# Patient Record
Sex: FEMALE | Race: WHITE | NOT HISPANIC OR LATINO | Employment: OTHER | URBAN - METROPOLITAN AREA
[De-identification: names, ages, dates, MRNs, and addresses within clinical notes are randomized per-mention and may not be internally consistent; named-entity substitution may affect disease eponyms.]

---

## 2023-03-22 LAB
AMMONIA (UMOL/L) IN PLASMA: 41 UMOL/L
ANION GAP IN SER/PLAS: 12 MMOL/L (ref 10–20)
BASOPHILS (10*3/UL) IN BLOOD BY AUTOMATED COUNT: 0.01 X10E9/L (ref 0–0.1)
BASOPHILS/100 LEUKOCYTES IN BLOOD BY AUTOMATED COUNT: 0.1 % (ref 0–2)
CALCIUM (MG/DL) IN SER/PLAS: 8.8 MG/DL (ref 8.6–10.3)
CARBON DIOXIDE, TOTAL (MMOL/L) IN SER/PLAS: 30 MMOL/L (ref 21–32)
CHLORIDE (MMOL/L) IN SER/PLAS: 101 MMOL/L (ref 98–107)
CREATININE (MG/DL) IN SER/PLAS: 0.98 MG/DL (ref 0.5–1.05)
EOSINOPHILS (10*3/UL) IN BLOOD BY AUTOMATED COUNT: 0.16 X10E9/L (ref 0–0.4)
EOSINOPHILS/100 LEUKOCYTES IN BLOOD BY AUTOMATED COUNT: 2 % (ref 0–6)
ERYTHROCYTE DISTRIBUTION WIDTH (RATIO) BY AUTOMATED COUNT: 15.4 % (ref 11.5–14.5)
ERYTHROCYTE MEAN CORPUSCULAR HEMOGLOBIN CONCENTRATION (G/DL) BY AUTOMATED: 29.9 G/DL (ref 32–36)
ERYTHROCYTE MEAN CORPUSCULAR VOLUME (FL) BY AUTOMATED COUNT: 89 FL (ref 80–100)
ERYTHROCYTES (10*6/UL) IN BLOOD BY AUTOMATED COUNT: 4.33 X10E12/L (ref 4–5.2)
GFR FEMALE: 57 ML/MIN/1.73M2
GLUCOSE (MG/DL) IN SER/PLAS: 110 MG/DL (ref 74–99)
HEMATOCRIT (%) IN BLOOD BY AUTOMATED COUNT: 38.4 % (ref 36–46)
HEMOGLOBIN (G/DL) IN BLOOD: 11.5 G/DL (ref 12–16)
IMMATURE GRANULOCYTES/100 LEUKOCYTES IN BLOOD BY AUTOMATED COUNT: 0.4 % (ref 0–0.9)
LEUKOCYTES (10*3/UL) IN BLOOD BY AUTOMATED COUNT: 8.1 X10E9/L (ref 4.4–11.3)
LYMPHOCYTES (10*3/UL) IN BLOOD BY AUTOMATED COUNT: 1.58 X10E9/L (ref 0.8–3)
LYMPHOCYTES/100 LEUKOCYTES IN BLOOD BY AUTOMATED COUNT: 19.6 % (ref 13–44)
MONOCYTES (10*3/UL) IN BLOOD BY AUTOMATED COUNT: 0.64 X10E9/L (ref 0.05–0.8)
MONOCYTES/100 LEUKOCYTES IN BLOOD BY AUTOMATED COUNT: 7.9 % (ref 2–10)
NEUTROPHILS (10*3/UL) IN BLOOD BY AUTOMATED COUNT: 5.65 X10E9/L (ref 1.6–5.5)
NEUTROPHILS/100 LEUKOCYTES IN BLOOD BY AUTOMATED COUNT: 70 % (ref 40–80)
NRBC (PER 100 WBCS) BY AUTOMATED COUNT: 0 /100 WBC (ref 0–0)
PLATELETS (10*3/UL) IN BLOOD AUTOMATED COUNT: 163 X10E9/L (ref 150–450)
POTASSIUM (MMOL/L) IN SER/PLAS: 4.4 MMOL/L (ref 3.5–5.3)
SODIUM (MMOL/L) IN SER/PLAS: 139 MMOL/L (ref 136–145)
UREA NITROGEN (MG/DL) IN SER/PLAS: 19 MG/DL (ref 6–23)

## 2025-05-12 ENCOUNTER — APPOINTMENT (OUTPATIENT)
Dept: RADIOLOGY | Facility: HOSPITAL | Age: 85
End: 2025-05-12
Payer: COMMERCIAL

## 2025-05-12 ENCOUNTER — HOSPITAL ENCOUNTER (EMERGENCY)
Facility: HOSPITAL | Age: 85
Discharge: HOME | End: 2025-05-12
Attending: EMERGENCY MEDICINE
Payer: COMMERCIAL

## 2025-05-12 VITALS
SYSTOLIC BLOOD PRESSURE: 139 MMHG | TEMPERATURE: 97 F | HEIGHT: 60 IN | HEART RATE: 73 BPM | BODY MASS INDEX: 42.85 KG/M2 | WEIGHT: 218.26 LBS | DIASTOLIC BLOOD PRESSURE: 69 MMHG | OXYGEN SATURATION: 98 % | RESPIRATION RATE: 14 BRPM

## 2025-05-12 DIAGNOSIS — S01.01XA LACERATION OF SCALP, INITIAL ENCOUNTER: ICD-10-CM

## 2025-05-12 DIAGNOSIS — M25.571 ACUTE RIGHT ANKLE PAIN: ICD-10-CM

## 2025-05-12 DIAGNOSIS — S09.90XA HEAD INJURY, INITIAL ENCOUNTER: Primary | ICD-10-CM

## 2025-05-12 PROCEDURE — 99285 EMERGENCY DEPT VISIT HI MDM: CPT | Performed by: EMERGENCY MEDICINE

## 2025-05-12 PROCEDURE — 12004 RPR S/N/AX/GEN/TRK7.6-12.5CM: CPT

## 2025-05-12 PROCEDURE — 2500000004 HC RX 250 GENERAL PHARMACY W/ HCPCS (ALT 636 FOR OP/ED): Mod: JZ | Performed by: EMERGENCY MEDICINE

## 2025-05-12 PROCEDURE — 96372 THER/PROPH/DIAG INJ SC/IM: CPT | Performed by: EMERGENCY MEDICINE

## 2025-05-12 PROCEDURE — 72125 CT NECK SPINE W/O DYE: CPT | Performed by: STUDENT IN AN ORGANIZED HEALTH CARE EDUCATION/TRAINING PROGRAM

## 2025-05-12 PROCEDURE — 2500000005 HC RX 250 GENERAL PHARMACY W/O HCPCS

## 2025-05-12 PROCEDURE — 73620 X-RAY EXAM OF FOOT: CPT | Mod: RIGHT SIDE | Performed by: RADIOLOGY

## 2025-05-12 PROCEDURE — 70450 CT HEAD/BRAIN W/O DYE: CPT | Performed by: STUDENT IN AN ORGANIZED HEALTH CARE EDUCATION/TRAINING PROGRAM

## 2025-05-12 PROCEDURE — 73610 X-RAY EXAM OF ANKLE: CPT | Mod: RIGHT SIDE | Performed by: RADIOLOGY

## 2025-05-12 PROCEDURE — 70450 CT HEAD/BRAIN W/O DYE: CPT

## 2025-05-12 PROCEDURE — 73620 X-RAY EXAM OF FOOT: CPT | Mod: RT

## 2025-05-12 PROCEDURE — 72125 CT NECK SPINE W/O DYE: CPT

## 2025-05-12 PROCEDURE — 2500000001 HC RX 250 WO HCPCS SELF ADMINISTERED DRUGS (ALT 637 FOR MEDICARE OP): Performed by: EMERGENCY MEDICINE

## 2025-05-12 PROCEDURE — 99284 EMERGENCY DEPT VISIT MOD MDM: CPT | Mod: 25 | Performed by: EMERGENCY MEDICINE

## 2025-05-12 PROCEDURE — 73610 X-RAY EXAM OF ANKLE: CPT | Mod: RT

## 2025-05-12 PROCEDURE — 2500000005 HC RX 250 GENERAL PHARMACY W/O HCPCS: Performed by: EMERGENCY MEDICINE

## 2025-05-12 PROCEDURE — 12004 RPR S/N/AX/GEN/TRK7.6-12.5CM: CPT | Performed by: EMERGENCY MEDICINE

## 2025-05-12 RX ORDER — MORPHINE SULFATE 2 MG/ML
2 INJECTION, SOLUTION INTRAMUSCULAR; INTRAVENOUS ONCE
Status: COMPLETED | OUTPATIENT
Start: 2025-05-12 | End: 2025-05-12

## 2025-05-12 RX ORDER — BACITRACIN ZINC 500 UNIT/G
OINTMENT IN PACKET (EA) TOPICAL ONCE
Status: COMPLETED | OUTPATIENT
Start: 2025-05-12 | End: 2025-05-12

## 2025-05-12 RX ORDER — ACETAMINOPHEN 325 MG/1
650 TABLET ORAL ONCE
Status: COMPLETED | OUTPATIENT
Start: 2025-05-12 | End: 2025-05-12

## 2025-05-12 RX ORDER — MORPHINE SULFATE 2 MG/ML
2 INJECTION, SOLUTION INTRAMUSCULAR; INTRAVENOUS ONCE
Status: DISCONTINUED | OUTPATIENT
Start: 2025-05-12 | End: 2025-05-12

## 2025-05-12 RX ADMIN — Medication 1 APPLICATION: at 01:21

## 2025-05-12 RX ADMIN — MORPHINE SULFATE 2 MG: 2 INJECTION, SOLUTION INTRAMUSCULAR; INTRAVENOUS at 04:15

## 2025-05-12 RX ADMIN — BACITRACIN 1 APPLICATION: 500 OINTMENT TOPICAL at 03:01

## 2025-05-12 RX ADMIN — ACETAMINOPHEN 650 MG: 325 TABLET ORAL at 01:21

## 2025-05-12 ASSESSMENT — LIFESTYLE VARIABLES
TOTAL SCORE: 0
HAVE YOU EVER FELT YOU SHOULD CUT DOWN ON YOUR DRINKING: NO
EVER FELT BAD OR GUILTY ABOUT YOUR DRINKING: NO
HAVE PEOPLE ANNOYED YOU BY CRITICIZING YOUR DRINKING: NO
EVER HAD A DRINK FIRST THING IN THE MORNING TO STEADY YOUR NERVES TO GET RID OF A HANGOVER: NO

## 2025-05-12 ASSESSMENT — PAIN - FUNCTIONAL ASSESSMENT
PAIN_FUNCTIONAL_ASSESSMENT: 0-10

## 2025-05-12 ASSESSMENT — COLUMBIA-SUICIDE SEVERITY RATING SCALE - C-SSRS
6. HAVE YOU EVER DONE ANYTHING, STARTED TO DO ANYTHING, OR PREPARED TO DO ANYTHING TO END YOUR LIFE?: NO
1. IN THE PAST MONTH, HAVE YOU WISHED YOU WERE DEAD OR WISHED YOU COULD GO TO SLEEP AND NOT WAKE UP?: NO
2. HAVE YOU ACTUALLY HAD ANY THOUGHTS OF KILLING YOURSELF?: NO

## 2025-05-12 ASSESSMENT — PAIN SCALES - GENERAL
PAINLEVEL_OUTOF10: 8
PAINLEVEL_OUTOF10: 0 - NO PAIN
PAINLEVEL_OUTOF10: 5 - MODERATE PAIN

## 2025-05-12 NOTE — ED PROVIDER NOTES
Emergency Department Provider Note       EMERGENCY DEPARTMENT ENCOUNTER      Pt Name: Riddhi Lopez  MRN: [id number]  Birthdate 1940  Date of evaluation: 5/12/2025    HISTORY OF PRESENT ILLNESS    Riddhi Lopez is an 85 y.o. female  presenting to the emergency department for fall, head laceration    85 year-old female from nursing facility had a fall where she hit her head on the part of the bed thinks it could be the post.  Has a laceration over the right side head.  Unknown loss of consciousness but was found awake and alert by staff.  Her primary language is remaining able to speak English.  She denies any major injuries or problems other than the scalp pain.  Not on blood thinner.      PAST MEDICAL HISTORY   Medical History[1]    SURGICAL HISTORY     Surgical History[2]    CURRENT MEDICATIONS       Previous Medications    No medications on file       ALLERGIES     Patient has no known allergies.    FAMILY HISTORY     Family History[3]     SOCIAL HISTORY     Social History[4]    PHYSICAL EXAM       ED Triage Vitals   Temp Pulse Resp BP   -- -- -- --      SpO2 Temp src Heart Rate Source Patient Position   -- -- -- --      BP Location FiO2 (%)     -- --       Physical Exam  Vitals and nursing note reviewed.   Constitutional:       General: She is not in acute distress.     Appearance: Normal appearance. She is not ill-appearing, toxic-appearing or diaphoretic.   HENT:      Head:        Comments: 8 cm right parietal laceration.  No active bleeding.  Without hematoma or crepitus of skull.  The rest of head and facial exam normal.  Cardiovascular:      Rate and Rhythm: Normal rate and regular rhythm.      Heart sounds: Normal heart sounds.   Pulmonary:      Effort: Pulmonary effort is normal.      Breath sounds: Normal breath sounds.   Abdominal:      Palpations: Abdomen is soft.      Tenderness: There is no abdominal tenderness. There is no guarding or rebound.   Musculoskeletal:         General: Tenderness  present. No swelling, deformity or signs of injury. Normal range of motion.      Cervical back: Normal, normal range of motion and neck supple. No tenderness.      Thoracic back: Normal.      Lumbar back: Normal.      Right hip: Normal.      Left hip: Normal.      Right ankle: No swelling or deformity. Tenderness present over the lateral malleolus. No medial malleolus, base of 5th metatarsal or proximal fibula tenderness. Normal range of motion.      Left ankle: Normal.      Right foot: Normal. No swelling, deformity, prominent metatarsal heads, tenderness or bony tenderness.      Left foot: Normal. No swelling, deformity, prominent metatarsal heads, tenderness or bony tenderness.        Legs:       Comments: Tender over the right lateral malleolus.  No deformity or effusion.   Skin:     General: Skin is warm and dry.      Comments: 8 cm laceration over the right parietal scalp.   Neurological:      General: No focal deficit present.      Mental Status: She is alert and oriented to person, place, and time.      GCS: GCS eye subscore is 4. GCS verbal subscore is 5. GCS motor subscore is 6.      Cranial Nerves: No cranial nerve deficit.      Motor: No weakness.   Psychiatric:         Mood and Affect: Mood normal.         Behavior: Behavior normal.          DIAGNOSTIC RESULTS     LABS:  Labs Reviewed - No data to display    All other labs were within normal range or not returned as of this dictation.    Imaging  XR ankle right 3+ views   Final Result   Right Foot:  No acute displaced fracture or dislocation.   Right Ankle:  No acute displaced fracture or dislocation.   Signed by Hakan Cornell MD      XR foot right 1-2 views   Final Result   Right Foot:  No acute displaced fracture or dislocation.   Right Ankle:  No acute displaced fracture or dislocation.   Signed by Hakan Cornell MD      CT head wo IV contrast   Final Result   CT HEAD:   1. No acute intracranial abnormality or calvarial fracture.   2. Moderate  burden of supratentorial chronic small vessel ischemic   disease.   3. Uncomplicated left suboccipital craniectomy.             CT CERVICAL SPINE:   1. No acute fracture or traumatic malalignment of the cervical spine.   2. Spondylotic changes of the cervical spine as detailed above.        MACRO:   None.        Signed by: Cornell Mora 5/12/2025 2:28 AM   Dictation workstation:   [id number]      CT cervical spine wo IV contrast   Final Result   CT HEAD:   1. No acute intracranial abnormality or calvarial fracture.   2. Moderate burden of supratentorial chronic small vessel ischemic   disease.   3. Uncomplicated left suboccipital craniectomy.             CT CERVICAL SPINE:   1. No acute fracture or traumatic malalignment of the cervical spine.   2. Spondylotic changes of the cervical spine as detailed above.        MACRO:   None.        Signed by: Cornell Mora 5/12/2025 2:28 AM   Dictation workstation:   [id number]           Procedures  Laceration Repair    Performed by: Marcelo Ferreira MD  Authorized by: Marcelo Ferreira MD    Consent:     Consent obtained:  Verbal    Consent given by:  Patient    Risks, benefits, and alternatives were discussed: yes      Risks discussed:  Infection and pain    Alternatives discussed:  No treatment  Universal protocol:     Procedure explained and questions answered to patient or proxy's satisfaction: yes      Patient identity confirmed:  Verbally with patient  Laceration details:     Location:  Scalp    Scalp location:  R parietal    Length (cm):  8  Pre-procedure details:     Preparation:  Patient was prepped and draped in usual sterile fashion  Exploration:     Hemostasis achieved with:  LET  Treatment:     Area cleansed with:  Saline    Amount of cleaning:  Standard    Irrigation solution:  Sterile saline    Irrigation method:  Syringe  Skin repair:     Repair method:  Staples    Number of staples:  2  Approximation:     Approximation:  Close  Post-procedure details:      Dressing:  Antibiotic ointment and non-adherent dressing    Procedure completion:  Tolerated  Comments:      Reevaluation of wound shows more like a skin traction type injury but there were only 2 points where the skin was not approximated.  Only 2 staples required.       EMERGENCY DEPARTMENT COURSE/MDM:   Medical Decision Making  CT head neck unremarkable.  There is a 8 cm laceration but I found that it was mostly well-approximated with just 2 spots that required staples.  Staples placed without complication.  Patient started complaining of right ankle pain.  During my initial assessment there was no pain on this ankle.  However she now is feeling some tenderness.  Without deformity.  X-ray of the right ankle and foot without abnormality.  May have a contusion or sprain.  We placed Aircast with Ace wrap for support.  Patient will return back to nursing facility with wound care instructions.  Also asked the medical provider for patient to reevaluate her ankle in the next day or 2.    Amount and/or Complexity of Data Reviewed  Independent Historian: EMS  Radiology: ordered. Decision-making details documented in ED Course.     Details: XR ankle right 3+ views   Final Result    Right Foot:  No acute displaced fracture or dislocation.    Right Ankle:  No acute displaced fracture or dislocation.    Signed by Hakan Cornell MD     XR foot right 1-2 views   Final Result    Right Foot:  No acute displaced fracture or dislocation.    Right Ankle:  No acute displaced fracture or dislocation.    Signed by Hakan Cornell MD     CT head wo IV contrast   Final Result    CT HEAD:    1. No acute intracranial abnormality or calvarial fracture.    2. Moderate burden of supratentorial chronic small vessel ischemic    disease.    3. Uncomplicated left suboccipital craniectomy.                CT CERVICAL SPINE:    1. No acute fracture or traumatic malalignment of the cervical spine.    2. Spondylotic changes of the cervical spine as  detailed above.          MACRO:    None.          Signed by: Cornell Mora 5/12/2025 2:28 AM    Dictation workstation:   [id number]     CT cervical spine wo IV contrast   Final Result    CT HEAD:    1. No acute intracranial abnormality or calvarial fracture.    2. Moderate burden of supratentorial chronic small vessel ischemic    disease.    3. Uncomplicated left suboccipital craniectomy.                CT CERVICAL SPINE:    1. No acute fracture or traumatic malalignment of the cervical spine.    2. Spondylotic changes of the cervical spine as detailed above.          MACRO:    None.          Signed by: Cornell oMra 5/12/2025 2:28 AM    Dictation workstation:   [id number]             ED Course as of 05/12/25 0511   Mon May 12, 2025   0235 CT head neck unremarkable.  Reexamination of patient she now notes experiencing some right ankle pain laterally which was not there on the primary assessment.  No deformity.  Will obtain x-ray of the ankle and foot. [NB]      ED Course User Index  [NB] Marcelo Ferreira MD         Diagnoses as of 05/12/25 0511   Head injury, initial encounter   Laceration of scalp, initial encounter   Acute right ankle pain        External records reviewed: recent inpatient, clinic, and prior ED notes  Labs and Diagnostic imaging independently reviewed/interpreted by me.    Patient plan, care, lab results and imaging were all discussed with attending.    ED Medications administered this visit:    Medications   lidocaine-EPINEPHrine-tetracaine (LET) 4-0.18-0.5 % gel 1 Application (1 Application Topical Given 5/12/25 0121)   acetaminophen (Tylenol) tablet 650 mg (650 mg oral Given 5/12/25 0121)   bacitracin ointment (1 Application Topical Given 5/12/25 0301)   morphine injection 2 mg (2 mg intramuscular Given 5/12/25 0415)     New Prescriptions from this visit:    New Prescriptions    No medications on file       (Please note that portions of this note were completed with a voice recognition  program.  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  [4]   Social History  Socioeconomic History    Marital status:         Marcelo Ferreira MD  05/12/25 [id number]

## 2025-05-12 NOTE — DISCHARGE INSTRUCTIONS
- Patient had a 8 cm area laceration but only needed to staples as most of the wound was held together already.  -Staples can be removed in 7 days or so.  -Head wrap and dressing can be removed in 48 hours.  -Can just keep a little triple antibiotic over the wound daily.  -Has some ankle pain with a normal x-ray.  Aircast put on for protection and comfort.  -Please have her ankle rehab evaluated by the medical provider in the next couple days.  -She should not put any weight on her ankle until healed